# Patient Record
Sex: MALE | Race: WHITE
[De-identification: names, ages, dates, MRNs, and addresses within clinical notes are randomized per-mention and may not be internally consistent; named-entity substitution may affect disease eponyms.]

---

## 2020-03-11 ENCOUNTER — HOSPITAL ENCOUNTER (OUTPATIENT)
Dept: HOSPITAL 61 - SURG | Age: 80
Discharge: HOME | End: 2020-03-11
Attending: SURGERY
Payer: MEDICARE

## 2020-03-11 VITALS — HEIGHT: 72 IN | WEIGHT: 185.19 LBS | BODY MASS INDEX: 25.08 KG/M2

## 2020-03-11 VITALS — SYSTOLIC BLOOD PRESSURE: 126 MMHG | DIASTOLIC BLOOD PRESSURE: 74 MMHG

## 2020-03-11 DIAGNOSIS — M31.6: ICD-10-CM

## 2020-03-11 DIAGNOSIS — H53.141: Primary | ICD-10-CM

## 2020-03-11 DIAGNOSIS — Z79.899: ICD-10-CM

## 2020-03-11 DIAGNOSIS — Z79.82: ICD-10-CM

## 2020-03-11 PROCEDURE — 88305 TISSUE EXAM BY PATHOLOGIST: CPT

## 2020-03-11 PROCEDURE — 37609 LIGATION/BX TEMPORAL ARTERY: CPT

## 2020-03-11 PROCEDURE — A7015 AEROSOL MASK USED W NEBULIZE: HCPCS

## 2020-03-11 PROCEDURE — A4461 SURGICL DRESS HOLD NON-REUSE: HCPCS

## 2020-03-11 NOTE — DISCH
DISCHARGE INSTRUCTIONS


Condition on Discharge


Condition on Discharge:  Stable





Activity After Discharge


Activity Instructions for Disc:  Avoid exertion





Diet after Discharge


Diet after Discharge:  Regular





Wound Incision Care


Other wound/incision instructi:  May shower in 24 hours





Contacting the  after DC


Call your doctor for:  If your condition worsens





Follow-Up


Follow up with:  Dr. Grant in 2 weeks











KING GRANT MD             Mar 11, 2020 12:49

## 2020-03-11 NOTE — PDOC4
Operative Note


Operative Note


Date: 3/11/2020


Preoperative diagnosis: Right-sided visual changes headache


Postoperative diagnosis: Same


Procedure: Right temporal artery biopsy


Surgeon: Avery


Specimen: Right temporal artery


Dictation: Patient is 79-year-old woman recently had some visual changes in his 

right eye with headaches was seen by the ophthalmologist is requesting a right 

temporal artery biopsy. Procedure of right temporal artery biopsy was explained 

to the patient in detail all risks benefits were also discussed including 

bleeding infection alternatives to this procedure also discussed with patient 

who seemed to understand and gave both verbal and written consent had the 

procedure performed. Patient was taken to the operating room placed in supine 

position general anesthesia was initiated once patient was sleep and intubated 

his right temporal area was prepped and draped usual sterile fashion using 

Betadine solution. An area around the temporal artery was injected with 1% 

Marcaine plain and incision was made with 15 blade scalpel was carried down 

through the subcutaneous tissue using electrocautery divided hemostasis down to 

the artery which was visualized there was secured proximally distally with 

Vicryl ligatures and excised sharply and sent for pathology. Wound was then 

closed in a single layer running 40 septic and a Monocryl Mastisol Steri-Strips 

and island dressing were applied. Patient was awakened expend an operating room 

taken to recovery in stable condition all sponge instrument needle counts listed

as correct estimated blood loss 5 mL











KING GRANT MD             Mar 11, 2020 12:47

## 2020-03-12 NOTE — PATHOLOGY
[facility] Accession Number: [id number]

.                                                                01

Material submitted:                                        .

artery - RIGHT TEMPORAL ARTERY. Modifiers: right, temporal

.                                                                01

Clinical history:                                          .

arteritis

.                                                                02

**********************************************************************

Diagnosis:

Right temporal artery biopsy:

- Temporal arteritis.

.

(JPM:mml; 03/12/2020)

[facility]  03/12/2020  1253 Local

**********************************************************************

.                                                                02

Electronically signed:                                     .

Eduardo Newman MD, Pathologist

NPI- [id number]

.                                                                01

Gross description:                                         .

The specimen is received in formalin, labeled "Tiffanicricketcristianoarmando Roger", "right

temporal artery".  Received is a short tubular segment of pale tan soft

tissue, measuring 0.5 cm in length and 0.2 cm in diameter.  One aspect of

the segment is inked black.  The specimen is entirely submitted intact in

cassette A1.([facility]; 3/11/2020)

[facility]  03/12/2020  1253 Local

.                                                                02

Pathologist provided ICD-10:

I77.6

.                                                                02

CPT                                                        .

[id number]

Specimen Comment: A courtesy copy of this report has been sent to [phone], [phone]

Specimen Comment: [id number]

Specimen Comment: Report sent to  / DR YAÑEZ

***Performed at:  01

   [facility]

   [address], [place], [place]  [id number]

   MD Viraj Gann MD Phone:  [phone]

***Performed at:  02

   [facility]

   [address] [place], [place]  [id number]

   MD Eduardo Newman MD Phone:  [phone]